# Patient Record
Sex: MALE | Race: WHITE | NOT HISPANIC OR LATINO | Employment: FULL TIME | URBAN - METROPOLITAN AREA
[De-identification: names, ages, dates, MRNs, and addresses within clinical notes are randomized per-mention and may not be internally consistent; named-entity substitution may affect disease eponyms.]

---

## 2017-11-22 ENCOUNTER — OFFICE VISIT (OUTPATIENT)
Dept: AUDIOLOGY | Facility: CLINIC | Age: 62
End: 2017-11-22

## 2017-11-22 DIAGNOSIS — H90.3 SENSORINEURAL HEARING LOSS, BILATERAL: Primary | ICD-10-CM

## 2017-11-22 PROCEDURE — 99207 ZZC NO CHARGE LOS: CPT | Performed by: AUDIOLOGIST

## 2017-11-22 PROCEDURE — V5299 HEARING SERVICE: HCPCS | Performed by: AUDIOLOGIST

## 2017-11-22 NOTE — MR AVS SNAPSHOT
"              After Visit Summary   2017    Wayne Gage    MRN: [id number]           Patient Information     Date Of Birth          1955        Visit Information        Provider Department      2017 8:00 AM [facility]        Today's Diagnoses     Sensorineural hearing loss, bilateral    -  1       Follow-ups after your visit        Who to contact     If you have questions or need follow up information about today's clinic visit or your schedule please contact [facility] directly at [phone].  Normal or non-critical lab and imaging results will be communicated to you by Linksifyhart, letter or phone within 4 business days after the clinic has received the results. If you do not hear from us within 7 days, please contact the clinic through Linksifyhart or phone. If you have a critical or abnormal lab result, we will notify you by phone as soon as possible.  Submit refill requests through Clutch or call your pharmacy and they will forward the refill request to us. Please allow 3 business days for your refill to be completed.          Additional Information About Your Visit        MyChart Information     Clutch lets you send messages to your doctor, view your test results, renew your prescriptions, schedule appointments and more. To sign up, go to [email or web address] . Click on \"Log in\" on the left side of the screen, which will take you to the Welcome page. Then click on \"Sign up Now\" on the right side of the page.     You will be asked to enter the access code listed below, as well as some personal information. Please follow the directions to create your username and password.     Your access code is: [email or web address]  Expires: 2018 10:11 AM     Your access code will  in 90 days. If you need help or a new code, please call your [facility] or [phone].        Care EveryWhere ID     This is your Care EveryWhere ID. This could be used " by other organizations to access your [facility] medical records  [id number]         Blood Pressure from Last 3 Encounters:   02/05/14 140/80   05/24/13 122/80   02/28/13 140/76    Weight from Last 3 Encounters:   02/05/14 172 lb (78 kg)   05/24/13 173 lb 3.2 oz (78.6 kg)   02/28/13 172 lb 12.8 oz (78.4 kg)              We Performed the Following     HEARING SERVICES, [facility]        Primary Care Provider Office Phone # Fax #    Fred Pimentel [phone]       [facility] [place] [address] [place] [address]        Equal Access to Services     JAZMIN ROBERTO : Hadii aad ku hadasho Soomaali, waaxda luqadaha, qaybta kaalmada adeegyada, waxay idiin hayilda ray. So [facility] [phone].    ATENCIÓN: Si habla español, tiene a pope disposición servicios gratuitos de asistencia lingüística. Llame al [phone].    We comply with applicable federal civil rights laws and [place] laws. We do not discriminate on the basis of race, color, national origin, age, disability, sex, sexual orientation, or gender identity.            Thank you!     Thank you for choosing [facility]  for your care. Our goal is always to provide you with excellent care. Hearing back from our patients is one way we can continue to improve our services. Please take a few minutes to complete the written survey that you may receive in the mail after your visit with us. Thank you!             Your Updated Medication List - Protect others around you: Learn how to safely use, store and throw away your medicines at www.disposemymeds.org.          This list is accurate as of: 11/22/17 10:11 AM.  Always use your most recent med list.                   Brand Name Dispense Instructions for use Diagnosis    ascorbic acid 250 MG Chew chewable tablet    vitamin C     Take 250 mg by mouth daily.        aspirin 81 MG tablet      Take 1 tablet by mouth daily.        carboxymethylcellulose 1 % ophthalmic  solution    CELLUVISC/REFRESH LIQUIGEL     Place 1 drop into both eyes as needed for dry eyes        cholecalciferol 400 UNIT Tabs tablet    vitamin D3     Take 400 Units by mouth daily.        diclofenac 75 MG EC tablet    VOLTAREN    60 tablet    Take 1 tablet (75 mg) by mouth 2 times daily Try just at dinner, but if needed increase to twice a day.    Right rotator cuff tear       Garlic 500 MG Tabs      Take  by mouth daily.        MENS 50+ MULTI VITAMIN/MIN Tabs      Take  by mouth 2 times daily.        sildenafil 50 MG tablet    VIAGRA    6 tablet    Take 1 tablet by mouth daily as needed.    ED (erectile dysfunction)       ZINC ACETATE      daily.

## 2017-11-22 NOTE — PROGRESS NOTES
"HEARING AID RECHECK    Patient Name:  Wayne Gage    Patient Age:   62 year old    :  1955    Background:   Patient is here to have his hearing aids checked for their \"annual check up\". Patient reports no problems with the hearing aids ane he reports hearing well with them. Patient has Phonak Trend[employer]enteo  hearing aids that were purchased in .     Procedures:   General cleaning of the hearing aids was performed. The small open domes and waxguards were changed. Per patient request extra small open domes were provided to the patient. Hearing aids were checked on the Verifit against  specifications and found to be performing well.     Plan:   Patient will return as needed for hearing aid concerns.     CHARGES:   .001 [facility] $30     Kofi De Los Santos CCC-A  Licensed Audiologist  2017      "

## 2018-02-21 ENCOUNTER — OFFICE VISIT (OUTPATIENT)
Dept: OTOLARYNGOLOGY | Facility: CLINIC | Age: 63
End: 2018-02-21
Payer: COMMERCIAL

## 2018-02-21 VITALS
TEMPERATURE: 97.5 F | HEART RATE: 86 BPM | HEIGHT: 68 IN | OXYGEN SATURATION: 99 % | SYSTOLIC BLOOD PRESSURE: 147 MMHG | WEIGHT: 173 LBS | DIASTOLIC BLOOD PRESSURE: 85 MMHG | BODY MASS INDEX: 26.22 KG/M2

## 2018-02-21 DIAGNOSIS — R09.82 PND (POST-NASAL DRIP): Primary | ICD-10-CM

## 2018-02-21 DIAGNOSIS — R05.9 COUGH: ICD-10-CM

## 2018-02-21 PROCEDURE — 99203 OFFICE O/P NEW LOW 30 MIN: CPT | Performed by: OTOLARYNGOLOGY

## 2018-02-21 RX ORDER — TAMSULOSIN HYDROCHLORIDE 0.4 MG/1
1 CAPSULE ORAL AT BEDTIME
Refills: 6 | COMMUNITY
Start: 2018-01-16

## 2018-02-21 NOTE — PATIENT INSTRUCTIONS
General Scheduling Information  To schedule your CT/MRI scan, please contact [place] [facility] at [phone]   [address] [place] [address]    To schedule your Surgery, please contact our Specialty Schedulers at [phone]    ENT Clinic Locations Clinic Hours Telephone Number     Loren[facility] Strong  [address] [place] [address]   Tuesday:       8:00am -- 4:00pm    Wednesday:  8:00am - 4:00pm   To schedule an appointment with   Dr. Carney,   please contact our   Specialty Scheduling Department at:     [phone]       Loren[facility]  [address] [place] [address]   Friday:          8:00am - 4:00pm         Urgent Care Locations Clinic Hours Telephone Numbers     Lorenzo Gao  [address] [place] [address]     Monday-Friday:     11:00pm - 9:00pm    Saturday-Sunday:  9:00am - 5:00pm   [phone]     Lorenzo Dempsey  [address] [place] [address]     Monday-Friday:      5:00pm - 9:00pm     Saturday-Sunday:  9:00am - 5:00pm   [phone]

## 2018-02-21 NOTE — LETTER
2/21/2018         RE: Wayne Gage  [address]  [place] [address]        Dear Colleague,    Thank you for referring your patient, Wayne Gage, to the [facility]. Please see a copy of my visit note below.    Chief Complaint - post-nasal drip    History of Present Illness - Wayne Gage is a 62 year old male who presents for evaluation of nasal drainage/post-nasal drainage. The patient describes symptoms of postnasal drainage and sore throat for the past 2.5 months. Then also had nasal congestion. Then it went into a coughing (purulent). Now has been coughing. Treatments within the last 2 months have included afrin (he was mouth breathing), z-pac, and nasal saline irrigations. The treatments seem to not help much. However, he is getting better. H/o sinus surgery by Dr. Amin. No history of smoking. No epistaxis.     Past Medical History -   Patient Active Problem List   Diagnosis     CARDIOVASCULAR SCREENING; LDL GOAL LESS THAN 160     ED (erectile dysfunction)     Advance care planning     BPH (benign prostatic hypertrophy)     Chronic maxillary sinusitis     Chronic ethmoidal sinusitis     Deviated nasal septum     Hx of iritis, od     Right rotator cuff tear     Blepharitis, unspecified laterality       Current Medications -   Current Outpatient Prescriptions:      tamsulosin (FLOMAX) 0.4 MG capsule, Take 1 capsule by mouth At Bedtime, Disp: , Rfl: 6     carboxymethylcellulose (CELLUVISC/REFRESH LIQUIGEL) 1 % ophthalmic solution, Place 1 drop into both eyes as needed for dry eyes, Disp: , Rfl:      sildenafil (VIAGRA) 50 MG tablet, Take 1 tablet by mouth daily as needed., Disp: 6 tablet, Rfl: 12     ascorbic acid (VITAMIN C) 250 MG CHEW, Take 250 mg by mouth daily., Disp: , Rfl:      aspirin 81 MG tablet, Take 1 tablet by mouth daily., Disp: , Rfl:      cholecalciferol (VITAMIN D) 400 UNIT TABS, Take 400 Units by mouth daily., Disp: , Rfl:      Multiple Vitamins-Minerals (MENS 50+  "MULTI VITAMIN/MIN) TABS, Take  by mouth 2 times daily., Disp: , Rfl:      diclofenac (VOLTAREN) 75 MG EC tablet, Take 1 tablet (75 mg) by mouth 2 times daily Try just at dinner, but if needed increase to twice a day. (Patient not taking: Reported on 2/21/2018), Disp: 60 tablet, Rfl: 3     ZINC ACETATE, daily., Disp: , Rfl:      Garlic 500 MG TABS, Take  by mouth daily., Disp: , Rfl:     Allergies - No Known Allergies    Social History -   Social History     Social History     Marital status:      Spouse name: N/A     Number of children: N/A     Years of education: N/A     Social History Main Topics     Smoking status: Never Smoker     Smokeless tobacco: Never Used     Alcohol use Yes      Comment: Occasional social drinks.     Drug use: No     Sexual activity: Yes     Partners: Female     Other Topics Concern     None     Social History Narrative       Family History -   Family History   Problem Relation Age of Onset     Breast Cancer Mother      CANCER Mother      pancreatic     DIABETES Maternal Grandmother      HEART DISEASE Brother      Alzheimer Disease Father      Circulatory Father      blood clot       Review of Systems - As per HPI and PMHx, otherwise 10+ comprehensive system review is negative.    Physical Exam  /85  Pulse 86  Temp 97.5  F (36.4  C) (Oral)  Ht 1.727 m (5' 8\")  Wt 78.5 kg (173 lb)  SpO2 99%  BMI 26.3 kg/m2  General - The patient is in no distress. Alert and oriented to person and place, answers questions and cooperates with examination appropriately.   Neurologic - CN II-XII are grossly intact. No focal neurologic deficits.   Voice and Breathing - The patient was breathing comfortably without the use of accessory muscles. There was no wheezing, stridor, or stertor.  The patients voice was clear and strong.  Eyes - Extraocular movements intact. Sclera were not icteric or injected, conjunctiva were pink and moist.  Mouth - Examination of the oral cavity showed pink, healthy " oral mucosa. No lesions or ulcerations noted.  The tongue was mobile and midline, and the dentition were in good condition.    Throat - The walls of the oropharynx were smooth, pink, moist, symmetric, and had no lesions or ulcerations.  The tonsillar pillars and soft palate were symmetric.  The uvula was midline on elevation. No postnasal drainage.  Nose - External contour is symmetric, no gross deflection or scars.  Nasal mucosa is pink and moist with clear mucus. The turbinates are normal in size. The septum was midline and non-obstructive.  No polyps, masses, or purulence noted on examination.  Neck -  Palpation of the occipital, submental, submandibular, internal jugular chain, and supraclavicular nodes did not demonstrate any abnormal lymph nodes or masses. No parotid masses. Palpation of the thyroid was soft and smooth, with no nodules or goiter appreciated.  The trachea was mobile and midline.    A/P - Wayne Gage is a 62 year old male with postnasal drainage and cough from post-viral, possibly sinus, illness. He is much better. I recommend treatment with flonase and saline irrigations and more time.     Robert Carney MD  [facility]tol[facility]  [facility]        Again, thank you for allowing me to participate in the care of your patient.        Sincerely,        Robert Carney MD

## 2018-02-21 NOTE — MR AVS SNAPSHOT
After Visit Summary   2/21/2018    Wayne Gage    MRN: [id number]           Patient Information     Date Of Birth          1955        Visit Information        Provider Department      2/21/2018 9:00 AM Robert Carney MD [facility] [place]        Today's Diagnoses     PND (post-nasal drip)    -  1    Cough          Care Instructions    General Scheduling Information  To schedule your CT/MRI scan, please contact [place] [facility] at [phone] [address] [place] [address]    To schedule your Surgery, please contact our Specialty Schedulers at [phone]    ENT Clinic Locations Clinic Hours Telephone Number     [facility]  [address]  [place] [address]   Tuesday:       8:00am -- 4:00pm    Wednesday:  8:00am - 4:00pm   To schedule an appointment with   Dr. Carney,   please contact our   Specialty Scheduling Department at:     [phone]       [facility]oren[facility]psey  [address]   [place] [address]   Friday:          8:00am - 4:00pm         Urgent Care Locations Clinic Hours Telephone Numbers     Lorenzo Gao  [address]  [place] [address]     Monday-Friday:     11:00pm - 9:00pm    Saturday-Sunday:  9:00am - 5:00pm   [phone]     Lorenzo Dempsey  [address]   [place] [address]     Monday-Friday:      5:00pm - 9:00pm     Saturday-Sunday:  9:00am - 5:00pm   [phone]                 Follow-ups after your visit        Your next 10 appointments already scheduled     Mar 26, 2018  8:00 AM CDT   New Visit with Harjit Deluna MD   [facility]    [address]  [place] [address] [phone]              Who to contact     If you have questions or need follow up information about today's clinic visit or your schedule please contact [facility] directly at [phone].  Normal or non-critical lab and imaging results will be communicated to you by  "MyChart, letter or phone within 4 business days after the clinic has received the results. If you do not hear from us within 7 days, please contact the clinic through Autobutlerhart or phone. If you have a critical or abnormal lab result, we will notify you by phone as soon as possible.  Submit refill requests through Itiva or call your pharmacy and they will forward the refill request to us. Please allow 3 business days for your refill to be completed.          Additional Information About Your Visit        Autobutlerhart Information     Itiva lets you send messages to your doctor, view your test results, renew your prescriptions, schedule appointments and more. To sign up, go to [email or web address] . Click on \"Log in\" on the left side of the screen, which will take you to the Welcome page. Then click on \"Sign up Now\" on the right side of the page.     You will be asked to enter the access code listed below, as well as some personal information. Please follow the directions to create your username and password.     Your access code is: [email or web address]  Expires: 2018  3:40 PM     Your access code will  in 90 days. If you need help or a new code, please call your [place] clinic or [phone].        Care EveryWhere ID     This is your Care EveryWhere ID. This could be used by other organizations to access your [place] medical records  [id number]        Your Vitals Were     Pulse Temperature Height Pulse Oximetry BMI (Body Mass Index)       86 97.5  F (36.4  C) (Oral) 1.727 m (5' 8\") 99% 26.3 kg/m2        Blood Pressure from Last 3 Encounters:   18 147/85   14 140/80   13 122/80    Weight from Last 3 Encounters:   18 78.5 kg (173 lb)   14 78 kg (172 lb)   13 78.6 kg (173 lb 3.2 oz)              Today, you had the following     No orders found for display       Primary Care Provider Office Phone # Fax #    Fred Pimentel [phone]       [facility] [place] [address]  [place] [address]        Equal Access to Services     RIRIKHARI FELISA : Hadii brandon koehler delmeraisha Soyenali, waaxda luqadaha, qaybta kaamberda ianphuongmary kate, waxay idiin haymaryellenbozena louisecuatefrancesco ray. So [facility] [phone].    ATENCIÓN: Si habla español, tiene a pope disposición servicios gratuitos de asistencia lingüística. Llame al [phone].    We comply with applicable federal civil rights laws and [place] laws. We do not discriminate on the basis of race, color, national origin, age, disability, sex, sexual orientation, or gender identity.            Thank you!     Thank you for choosing [facility]  for your care. Our goal is always to provide you with excellent care. Hearing back from our patients is one way we can continue to improve our services. Please take a few minutes to complete the written survey that you may receive in the mail after your visit with us. Thank you!             Your Updated Medication List - Protect others around you: Learn how to safely use, store and throw away your medicines at www.disposemymeds.org.          This list is accurate as of 2/21/18  3:40 PM.  Always use your most recent med list.                   Brand Name Dispense Instructions for use Diagnosis    ascorbic acid 250 MG Chew chewable tablet    vitamin C     Take 250 mg by mouth daily.        aspirin 81 MG tablet      Take 1 tablet by mouth daily.        carboxymethylcellulose 1 % ophthalmic solution    CELLUVISC/REFRESH LIQUIGEL     Place 1 drop into both eyes as needed for dry eyes        cholecalciferol 400 UNIT Tabs tablet    vitamin D3     Take 400 Units by mouth daily.        diclofenac 75 MG EC tablet    VOLTAREN    60 tablet    Take 1 tablet (75 mg) by mouth 2 times daily Try just at dinner, but if needed increase to twice a day.    Right rotator cuff tear       Garlic 500 MG Tabs      Take  by mouth daily.        MENS 50+ MULTI VITAMIN/MIN Tabs      Take  by mouth 2 times daily.         sildenafil 50 MG tablet    VIAGRA    6 tablet    Take 1 tablet by mouth daily as needed.    ED (erectile dysfunction)       tamsulosin 0.4 MG capsule    FLOMAX     Take 1 capsule by mouth At Bedtime        ZINC ACETATE      daily.

## 2018-02-21 NOTE — PROGRESS NOTES
Chief Complaint - post-nasal drip    History of Present Illness - Wayne Gage is a 62 year old male who presents for evaluation of nasal drainage/post-nasal drainage. The patient describes symptoms of postnasal drainage and sore throat for the past 2.5 months. Then also had nasal congestion. Then it went into a coughing (purulent). Now has been coughing. Treatments within the last 2 months have included afrin (he was mouth breathing), z-pac, and nasal saline irrigations. The treatments seem to not help much. However, he is getting better. H/o sinus surgery by Dr. Amin. No history of smoking. No epistaxis.     Past Medical History -   Patient Active Problem List   Diagnosis     CARDIOVASCULAR SCREENING; LDL GOAL LESS THAN 160     ED (erectile dysfunction)     Advance care planning     BPH (benign prostatic hypertrophy)     Chronic maxillary sinusitis     Chronic ethmoidal sinusitis     Deviated nasal septum     Hx of iritis, od     Right rotator cuff tear     Blepharitis, unspecified laterality       Current Medications -   Current Outpatient Prescriptions:      tamsulosin (FLOMAX) 0.4 MG capsule, Take 1 capsule by mouth At Bedtime, Disp: , Rfl: 6     carboxymethylcellulose (CELLUVISC/REFRESH LIQUIGEL) 1 % ophthalmic solution, Place 1 drop into both eyes as needed for dry eyes, Disp: , Rfl:      sildenafil (VIAGRA) 50 MG tablet, Take 1 tablet by mouth daily as needed., Disp: 6 tablet, Rfl: 12     ascorbic acid (VITAMIN C) 250 MG CHEW, Take 250 mg by mouth daily., Disp: , Rfl:      aspirin 81 MG tablet, Take 1 tablet by mouth daily., Disp: , Rfl:      cholecalciferol (VITAMIN D) 400 UNIT TABS, Take 400 Units by mouth daily., Disp: , Rfl:      Multiple Vitamins-Minerals (MENS 50+ MULTI VITAMIN/MIN) TABS, Take  by mouth 2 times daily., Disp: , Rfl:      diclofenac (VOLTAREN) 75 MG EC tablet, Take 1 tablet (75 mg) by mouth 2 times daily Try just at dinner, but if needed increase to twice a day. (Patient not taking:  "Reported on 2/21/2018), Disp: 60 tablet, Rfl: 3     ZINC ACETATE, daily., Disp: , Rfl:      Garlic 500 MG TABS, Take  by mouth daily., Disp: , Rfl:     Allergies - No Known Allergies    Social History -   Social History     Social History     Marital status:      Spouse name: N/A     Number of children: N/A     Years of education: N/A     Social History Main Topics     Smoking status: Never Smoker     Smokeless tobacco: Never Used     Alcohol use Yes      Comment: Occasional social drinks.     Drug use: No     Sexual activity: Yes     Partners: Female     Other Topics Concern     None     Social History Narrative       Family History -   Family History   Problem Relation Age of Onset     Breast Cancer Mother      CANCER Mother      pancreatic     DIABETES Maternal Grandmother      HEART DISEASE Brother      Alzheimer Disease Father      Circulatory Father      blood clot       Review of Systems - As per HPI and PMHx, otherwise 10+ comprehensive system review is negative.    Physical Exam  /85  Pulse 86  Temp 97.5  F (36.4  C) (Oral)  Ht 1.727 m (5' 8\")  Wt 78.5 kg (173 lb)  SpO2 99%  BMI 26.3 kg/m2  General - The patient is in no distress. Alert and oriented to person and place, answers questions and cooperates with examination appropriately.   Neurologic - CN II-XII are grossly intact. No focal neurologic deficits.   Voice and Breathing - The patient was breathing comfortably without the use of accessory muscles. There was no wheezing, stridor, or stertor.  The patients voice was clear and strong.  Eyes - Extraocular movements intact. Sclera were not icteric or injected, conjunctiva were pink and moist.  Mouth - Examination of the oral cavity showed pink, healthy oral mucosa. No lesions or ulcerations noted.  The tongue was mobile and midline, and the dentition were in good condition.    Throat - The walls of the oropharynx were smooth, pink, moist, symmetric, and had no lesions or ulcerations.  " The tonsillar pillars and soft palate were symmetric.  The uvula was midline on elevation. No postnasal drainage.  Nose - External contour is symmetric, no gross deflection or scars.  Nasal mucosa is pink and moist with clear mucus. The turbinates are normal in size. The septum was midline and non-obstructive.  No polyps, masses, or purulence noted on examination.  Neck -  Palpation of the occipital, submental, submandibular, internal jugular chain, and supraclavicular nodes did not demonstrate any abnormal lymph nodes or masses. No parotid masses. Palpation of the thyroid was soft and smooth, with no nodules or goiter appreciated.  The trachea was mobile and midline.    A/P - Wayne Gage is a 62 year old male with postnasal drainage and cough from post-viral, possibly sinus, illness. He is much better. I recommend treatment with flonase and saline irrigations and more time.     Robert Carney MD  Otolaryngology  [facility]

## 2018-02-27 ENCOUNTER — TELEPHONE (OUTPATIENT)
Dept: OPHTHALMOLOGY | Facility: CLINIC | Age: 63
End: 2018-02-27

## 2018-02-27 NOTE — TELEPHONE ENCOUNTER
Wayne is traveling over the next month and would like to have his glasses prescription with him in case something happens.  He would like to have the expiration date extended for a month.  Please email the new RX to him at [email or web address] when and if the date has been changed.  Please call him on his cell if there are any problems.  He will be leaving in a couple of days.     Luna [place]

## 2018-02-28 NOTE — TELEPHONE ENCOUNTER
I assisted Mario in taking care of this yesterday.  It was emailed to the patient with an adjusted expiration date.

## 2018-04-27 ENCOUNTER — OFFICE VISIT (OUTPATIENT)
Dept: OPHTHALMOLOGY | Facility: CLINIC | Age: 63
End: 2018-04-27
Payer: COMMERCIAL

## 2018-04-27 DIAGNOSIS — H01.02B SQUAMOUS BLEPHARITIS OF UPPER AND LOWER EYELIDS OF BOTH EYES: ICD-10-CM

## 2018-04-27 DIAGNOSIS — H01.02A SQUAMOUS BLEPHARITIS OF UPPER AND LOWER EYELIDS OF BOTH EYES: ICD-10-CM

## 2018-04-27 DIAGNOSIS — H20.9 IRITIS: Primary | ICD-10-CM

## 2018-04-27 DIAGNOSIS — H52.4 PRESBYOPIA: ICD-10-CM

## 2018-04-27 PROCEDURE — 92014 COMPRE OPH EXAM EST PT 1/>: CPT | Performed by: OPHTHALMOLOGY

## 2018-04-27 PROCEDURE — 92015 DETERMINE REFRACTIVE STATE: CPT | Performed by: OPHTHALMOLOGY

## 2018-04-27 ASSESSMENT — SLIT LAMP EXAM - LIDS
COMMENTS: 2+ BLEPHARITIS, 2+ MEIBOMIAN GLAND DYSFUNCTION
COMMENTS: 2+ BLEPHARITIS, 2+ MEIBOMIAN GLAND DYSFUNCTION

## 2018-04-27 ASSESSMENT — REFRACTION_WEARINGRX
OS_CYLINDER: +0.50
OS_SPHERE: +1.25
OS_ADD: +2.53
OD_AXIS: 075
OD_SPHERE: +1.25
OD_ADD: +2.50
OS_ADD: +2.50
SPECS_TYPE: PAL
OD_SPHERE: +1.00
OS_SPHERE: +0.75
OD_CYLINDER: +0.50
OD_CYLINDER: SPHERE
OS_AXIS: 006
OS_CYLINDER: SPHERE
OD_ADD: +2.53

## 2018-04-27 ASSESSMENT — VISUAL ACUITY
OD_SC: 20/20
CORRECTION_TYPE: GLASSES
METHOD: SNELLEN - LINEAR
OD_SC+: -1
OS_SC: 20/20

## 2018-04-27 ASSESSMENT — REFRACTION_MANIFEST
OD_CYLINDER: +0.50
OS_CYLINDER: +0.25
OD_AXIS: 028
OS_ADD: +2.50
OD_SPHERE: +1.25
OD_ADD: +2.50
OS_SPHERE: +0.75
OS_AXIS: 175

## 2018-04-27 ASSESSMENT — TONOMETRY
OD_IOP_MMHG: 15
OS_IOP_MMHG: 16
IOP_METHOD: APPLANATION

## 2018-04-27 ASSESSMENT — EXTERNAL EXAM - RIGHT EYE: OD_EXAM: NORMAL

## 2018-04-27 ASSESSMENT — CONF VISUAL FIELD
METHOD: COUNTING FINGERS
OS_NORMAL: 1
OD_NORMAL: 1

## 2018-04-27 ASSESSMENT — CUP TO DISC RATIO
OD_RATIO: 0.6
OS_RATIO: 0.6

## 2018-04-27 ASSESSMENT — EXTERNAL EXAM - LEFT EYE: OS_EXAM: NORMAL

## 2018-04-27 NOTE — PATIENT INSTRUCTIONS
Glasses Rx given - optional  Possible posterior vitreous detachment (sudden onset large floater and/or flashing lights) discussed.  Call in December 2019 for an appointment in April 2020 for Complete Exam    Dr. Deluna [phone]

## 2018-04-27 NOTE — MR AVS SNAPSHOT
"              After Visit Summary   4/27/2018    Wayne Gage    MRN: [id number]           Patient Information     Date Of Birth          1955        Visit Information        Provider Department      4/27/2018 2:30 PM Harjit Deluna MD [facility]        Today's Diagnoses     Presbyopia    -  1      Care Instructions    Glasses Rx given - optional  Possible posterior vitreous detachment (sudden onset large floater and/or flashing lights) discussed.  Call in December 2019 for an appointment in April 2020 for Complete Exam    Dr. Deluna [phone]          Follow-ups after your visit        Who to contact     If you have questions or need follow up information about today's clinic visit or your schedule please contact [facility] directly at [phone].  Normal or non-critical lab and imaging results will be communicated to you by MyChart, letter or phone within 4 business days after the clinic has received the results. If you do not hear from us within 7 days, please contact the clinic through MyChart or phone. If you have a critical or abnormal lab result, we will notify you by phone as soon as possible.  Submit refill requests through [employer] or call your pharmacy and they will forward the refill request to us. Please allow 3 business days for your refill to be completed.          Additional Information About Your Visit        MyChart Information     [employer] lets you send messages to your doctor, view your test results, renew your prescriptions, schedule appointments and more. To sign up, go to [email or web address] . Click on \"Log in\" on the left side of the screen, which will take you to the Welcome page. Then click on \"Sign up Now\" on the right side of the page.     You will be asked to enter the access code listed below, as well as some personal information. Please follow the directions to create your username and password.     Your access code is: " [email or web address]  Expires: 2018  4:40 PM     Your access code will  in 90 days. If you need help or a new code, please call your [place] clinic or [phone].        Care EveryWhere ID     This is your Care EveryWhere ID. This could be used by other organizations to access your [place] medical records  [id number]         Blood Pressure from Last 3 Encounters:   18 147/85   14 140/80   13 122/80    Weight from Last 3 Encounters:   18 78.5 kg (173 lb)   14 78 kg (172 lb)   13 78.6 kg (173 lb 3.2 oz)              Today, you had the following     No orders found for display       Primary Care Provider Office Phone # Fax #    Fred Pimentel [phone]       [facility] [place] [address] [place] [address]        Equal Access to Services     [facility] : Hadii aad ku hadasho Soomaali, waaxda luqadaha, qaybta kaalmada adeegyada, waxay idiin hayaan adeeg kharash la'aan . So [facility] [phone].    ATENCIÓN: Si habla español, tiene a pope disposición servicios gratuitos de asistencia lingüística. Llame al [phone].    We comply with applicable federal civil rights laws and [place] laws. We do not discriminate on the basis of race, color, national origin, age, disability, sex, sexual orientation, or gender identity.            Thank you!     Thank you for choosing [facility]  for your care. Our goal is always to provide you with excellent care. Hearing back from our patients is one way we can continue to improve our services. Please take a few minutes to complete the written survey that you may receive in the mail after your visit with us. Thank you!             Your Updated Medication List - Protect others around you: Learn how to safely use, store and throw away your medicines at www.disposemymeds.org.          This list is accurate as of 18  4:09 PM.  Always use your most recent med list.                   Brand  Name Dispense Instructions for use Diagnosis    ascorbic acid 250 MG Chew chewable tablet    vitamin C     Take 250 mg by mouth daily.        aspirin 81 MG tablet      Take 1 tablet by mouth daily.        carboxymethylcellulose 1 % ophthalmic solution    CELLUVISC/REFRESH LIQUIGEL     Place 1 drop into both eyes as needed for dry eyes        cholecalciferol 400 UNIT Tabs tablet    vitamin D3     Take 400 Units by mouth daily.        diclofenac 75 MG EC tablet    VOLTAREN    60 tablet    Take 1 tablet (75 mg) by mouth 2 times daily Try just at dinner, but if needed increase to twice a day.    Right rotator cuff tear       Garlic 500 MG Tabs      Take  by mouth daily.        MENS 50+ MULTI VITAMIN/MIN Tabs      Take  by mouth 2 times daily.        REFRESH OP      Apply 1 drop to eye as needed Both eyes.        sildenafil 50 MG tablet    VIAGRA    6 tablet    Take 1 tablet by mouth daily as needed.    ED (erectile dysfunction)       tamsulosin 0.4 MG capsule    FLOMAX     Take 1 capsule by mouth At Bedtime        ZINC ACETATE      daily.

## 2018-04-27 NOTE — PROGRESS NOTES
Current Eye Medications:  Refresh as needed both eyes.     Subjective:  Complete eye exam. Vision is good both eyes distance and near. No eye pain or discomfort in either eye. Having couple of new floaters come and go in last 2 weeks, unsure what eye. Has had different floaters in past. Zero light flashes or curtain effect both eyes.     Objective:  See Ophthalmology Exam.       Assessment:  Stable eye exam.      ICD-10-CM    1. Hx of iritis, od H20.9 EYE EXAM (SIMPLE-NONBILLABLE)   2. Squamous blepharitis of upper and lower eyelids of both eyes H01.021     H01.022     H01.024     H01.025    3. Presbyopia H52.4 REFRACTIVE STATUS        Plan:  Glasses Rx given - optional  Possible posterior vitreous detachment (sudden onset large floater and/or flashing lights) discussed.  Call in December 2019 for an appointment in April 2020 for Complete Exam    Dr. Deluna [phone]

## 2018-04-27 NOTE — LETTER
4/27/2018         RE: Wayne Gage  [address]  [place] [address]        Dear Colleague,    Thank you for referring your patient, Wayne Gage, to the [facility]. Please see a copy of my visit note below.     Current Eye Medications:  Refresh as needed both eyes.     Subjective:  Complete eye exam. Vision is good both eyes distance and near. No eye pain or discomfort in either eye. Having couple of new floaters come and go in last 2 weeks, unsure what eye. Has had different floaters in past. Zero light flashes or curtain effect both eyes.     Objective:  See Ophthalmology Exam.       Assessment:  Stable eye exam.      ICD-10-CM    1. Hx of iritis, od H20.9 EYE EXAM (SIMPLE-NONBILLABLE)   2. Squamous blepharitis of upper and lower eyelids of both eyes H01.021     H01.022     H01.024     H01.025    3. Presbyopia H52.4 REFRACTIVE STATUS        Plan:  Glasses Rx given - optional  Possible posterior vitreous detachment (sudden onset large floater and/or flashing lights) discussed.  Call in December 2019 for an appointment in April 2020 for Complete Exam    Dr. Deluna [phone]           Again, thank you for allowing me to participate in the care of your patient.        Sincerely,        Harjit Deluna MD

## 2019-02-22 ENCOUNTER — OFFICE VISIT (OUTPATIENT)
Dept: AUDIOLOGY | Facility: CLINIC | Age: 64
End: 2019-02-22
Payer: COMMERCIAL

## 2019-02-22 DIAGNOSIS — H90.3 SENSORINEURAL HEARING LOSS, BILATERAL: Primary | ICD-10-CM

## 2019-02-22 PROCEDURE — 92557 COMPREHENSIVE HEARING TEST: CPT | Performed by: AUDIOLOGIST

## 2019-02-22 PROCEDURE — V5299 HEARING SERVICE: HCPCS | Performed by: AUDIOLOGIST

## 2019-02-22 PROCEDURE — 99207 ZZC NO CHARGE LOS: CPT | Performed by: AUDIOLOGIST

## 2019-02-22 PROCEDURE — 92550 TYMPANOMETRY & REFLEX THRESH: CPT | Performed by: AUDIOLOGIST

## 2019-02-22 NOTE — PROGRESS NOTES
AUDIOLOGY REPORT    SUBJECTIVE:  Wayne Gage is a 63 year old male who was seen in the Audiology Clinic [facility] on 2/22/19 for audiologic evaluation, referred by No ref. provider found. The patient has been seen previously in this clinic on 11/28/2016 for assessment and results indicated bilateral hearing loss that was previously shown to be sensorineural. The patient reports a gradual change in hearing and bilateral tinnitus that is not bothersome. The patient denies  bilateral otalgia, bilateral aural fullness, history of noise exposure, and dizziness. The patient notes difficulty with communication in a variety of listening situations. They were accompanied today by their self.    OBJECTIVE:    Otoscopic exam indicates ears are clear of cerumen bilaterally     Pure Tone Thresholds assessed using standard techniques  audiometry with good  reliability from 250-8000 Hz bilaterally using insert earphones     RIGHT:  normal hearing sensitivity through 1500 Hz then mild to severe sensorineural hearing loss    LEFT:    normal hearing sensitivity through 1500 Hz then mild to severe sensorineural hearing loss    Tympanogram:    RIGHT: normal eardrum mobility    LEFT:   normal eardrum mobility    Speech Reception Threshold:    RIGHT: 20 dB HL    LEFT:   20 dB HL    Word Recognition Score:     RIGHT: 88% at 65 dB HL using NU-6 recorded word list.    LEFT:   96% at 65 dB HL using NU-6 recorded word list.      ASSESSMENT:   Bilateral sensorineural hearing loss    Compared to patient's previous audiogram dated 11/28/2016, hearing has slightly worsened in the high frequencies. Today s results were discussed with the patient in detail.     Following the hearing test the patient's hearing aids were cleaned and checked. A biologic listening check found the hearing aids sounding crisp and clear. The domes were changed to medium closed domes and the acoustic parameters were changed in the software. The patient  reported improved sound.    PLAN:  Patient was counseled regarding hearing loss and impact on communication.  It is recommended that the patient call in two weeks to let us know if he is satisfied with the changes made to the hearing aids. If he does we will send him more medium closed domes.  Please call this clinic with questions regarding these results or recommendations.      Mirlande De Los Santos CCC-A  Licensed Audiologist #[id number]    Kofi De Los Santos CCC-A  Licensed Audiologist #[id number]  2/22/2019

## 2019-03-04 ENCOUNTER — TELEPHONE (OUTPATIENT)
Dept: AUDIOLOGY | Facility: CLINIC | Age: 64
End: 2019-03-04

## 2019-03-04 NOTE — TELEPHONE ENCOUNTER
Patient called to report that he is liking the change to the closed domes and he would like more sent to his home address. Medium closed domes for Phonak were mailed to the address on file.    -Kofi De Los Santos CCC-A

## 2019-03-15 ENCOUNTER — OFFICE VISIT (OUTPATIENT)
Dept: OPHTHALMOLOGY | Facility: CLINIC | Age: 64
End: 2019-03-15
Payer: COMMERCIAL

## 2019-03-15 DIAGNOSIS — H43.811 POSTERIOR VITREOUS DETACHMENT OF RIGHT EYE: Primary | ICD-10-CM

## 2019-03-15 PROCEDURE — 92012 INTRM OPH EXAM EST PATIENT: CPT | Performed by: OPHTHALMOLOGY

## 2019-03-15 ASSESSMENT — VISUAL ACUITY
OS_CC: 20/20
CORRECTION_TYPE: GLASSES
METHOD: SNELLEN - LINEAR
OD_CC: 20/25

## 2019-03-15 ASSESSMENT — TONOMETRY
IOP_METHOD: APPLANATION
OD_IOP_MMHG: 16
OS_IOP_MMHG: 18

## 2019-03-15 ASSESSMENT — CUP TO DISC RATIO
OD_RATIO: 0.6
OS_RATIO: 0.6

## 2019-03-15 ASSESSMENT — EXTERNAL EXAM - RIGHT EYE: OD_EXAM: NORMAL

## 2019-03-15 ASSESSMENT — EXTERNAL EXAM - LEFT EYE: OS_EXAM: NORMAL

## 2019-03-15 NOTE — LETTER
"    3/15/2019         RE: Wayne Gage  [address] [address]        Dear Colleague,    Thank you for referring your patient, Wayne Gage, to the [facility]. Please see a copy of my visit note below.     Current Eye Medications:  systane prn     Subjective:  Flashes and floater right eye    As above plus  noticed two days ago , continues to see arc like flashes in outer portion of vision and a floater that will float in front of her central vision. Pt states that had this \"almost like something fell in this eye sensation \" just before noticed flashing.      Objective:  See Ophthalmology Exam.       Assessment:  Acute posterior vitreous detachment right eye.      Plan:  You have a posterior vitreous detachment in the right eye.  Signs and symptoms of retinal detachment (shower of black floaters, frequent flashing even during day, curtain over part of visual field) discussed.  Return visit 5 - 6 weeks for a complete exam.  Harjit Deluna M.D.  [phone]             Again, thank you for allowing me to participate in the care of your patient.        Sincerely,        Harjit Deluna MD    "

## 2019-03-15 NOTE — PROGRESS NOTES
" Current Eye Medications:  systane prn     Subjective:  Flashes and floater right eye    As above plus  noticed two days ago , continues to see arc like flashes in outer portion of vision and a floater that will float in front of her central vision. Pt states that had this \"almost like something fell in this eye sensation \" just before noticed flashing.      Objective:  See Ophthalmology Exam.       Assessment:  Acute posterior vitreous detachment right eye.      Plan:  You have a posterior vitreous detachment in the right eye.  Signs and symptoms of retinal detachment (shower of black floaters, frequent flashing even during day, curtain over part of visual field) discussed.  Return visit 5 - 6 weeks for a complete exam.  Harjit Deluna M.D.  [phone]           "

## 2019-03-15 NOTE — PATIENT INSTRUCTIONS
You have a posterior vitreous detachment in the right eye.  Signs and symptoms of retinal detachment (shower of black floaters, frequent flashing even during day, curtain over part of visual field) discussed.  Return visit 5 - 6 weeks for a complete exam.  Harjit Deluna M.D.  [phone]

## 2019-03-16 PROBLEM — H43.811 POSTERIOR VITREOUS DETACHMENT OF RIGHT EYE: Status: ACTIVE | Noted: 2019-03-16

## 2019-04-29 ENCOUNTER — OFFICE VISIT (OUTPATIENT)
Dept: OPHTHALMOLOGY | Facility: CLINIC | Age: 64
End: 2019-04-29
Payer: COMMERCIAL

## 2019-04-29 DIAGNOSIS — Z01.01 ENCOUNTER FOR EXAMINATION OF EYES AND VISION WITH ABNORMAL FINDINGS: Primary | ICD-10-CM

## 2019-04-29 DIAGNOSIS — H43.811 POSTERIOR VITREOUS DETACHMENT OF RIGHT EYE: ICD-10-CM

## 2019-04-29 DIAGNOSIS — H52.4 PRESBYOPIA: ICD-10-CM

## 2019-04-29 DIAGNOSIS — H01.02A SQUAMOUS BLEPHARITIS OF UPPER AND LOWER EYELIDS OF BOTH EYES: ICD-10-CM

## 2019-04-29 DIAGNOSIS — H20.9 IRITIS: ICD-10-CM

## 2019-04-29 DIAGNOSIS — H01.02B SQUAMOUS BLEPHARITIS OF UPPER AND LOWER EYELIDS OF BOTH EYES: ICD-10-CM

## 2019-04-29 PROCEDURE — 92015 DETERMINE REFRACTIVE STATE: CPT | Performed by: OPHTHALMOLOGY

## 2019-04-29 PROCEDURE — 92014 COMPRE OPH EXAM EST PT 1/>: CPT | Performed by: OPHTHALMOLOGY

## 2019-04-29 ASSESSMENT — REFRACTION_WEARINGRX
OS_SPHERE: +0.75
OS_CYLINDER: SPHERE
SPECS_TYPE: PAL
OD_SPHERE: +1.25
OS_ADD: +2.50
OD_CYLINDER: SPHERE
OD_ADD: +2.50

## 2019-04-29 ASSESSMENT — CUP TO DISC RATIO
OS_RATIO: 0.6
OD_RATIO: 0.6

## 2019-04-29 ASSESSMENT — CONF VISUAL FIELD
OD_NORMAL: 1
OS_NORMAL: 1

## 2019-04-29 ASSESSMENT — EXTERNAL EXAM - RIGHT EYE: OD_EXAM: NORMAL

## 2019-04-29 ASSESSMENT — REFRACTION_MANIFEST
OD_ADD: +2.50
OS_CYLINDER: +0.50
OS_ADD: +2.50
OD_CYLINDER: +0.50
OD_SPHERE: +1.50
OS_AXIS: 026
OS_SPHERE: +1.00
OD_AXIS: 002

## 2019-04-29 ASSESSMENT — VISUAL ACUITY
OD_CC+: -2
OD_CC: 1-
OD_CC: 20/20
METHOD: SNELLEN - LINEAR
CORRECTION_TYPE: GLASSES
OS_CC: 20/20
OS_CC: 1-

## 2019-04-29 ASSESSMENT — TONOMETRY
OS_IOP_MMHG: 13
IOP_METHOD: APPLANATION
OD_IOP_MMHG: 13

## 2019-04-29 ASSESSMENT — EXTERNAL EXAM - LEFT EYE: OS_EXAM: NORMAL

## 2019-04-29 NOTE — LETTER
4/29/2019         RE: Wayne Gage  [address]  [place] [address]        Dear Colleague,    Thank you for referring your patient, Wayne Gage, to the [facility]. Please see a copy of my visit note below.     Current Eye Medications:  Refresh liquigel both eyes once every 4-5 days.      Subjective:  Comprehensive Eye Exam.  The flashing lights have diminished, but are still somewhat present.  He has a floater in his right eye that gets in the way of his vision occasionally, but hasn't been much of a problem. No changes or concerns with his left eye.       Objective:  See Ophthalmology Exam.       Assessment:  Stable recent posterior vitreous detachment right eye; otherwise stable eye exam.      ICD-10-CM    1. Encounter for examination of eyes and vision with abnormal findings Z01.01 REFRACTIVE STATUS   2. Presbyopia H52.4 REFRACTIVE STATUS   3. Posterior vitreous detachment of right eye H43.811 EYE EXAM (SIMPLE-NONBILLABLE)   4. Hx of iritis, od H20.9    5. Squamous blepharitis of upper and lower eyelids of both eyes H01.02A     H01.02B         Plan:  Possible posterior vitreous detachment (sudden onset large floater and/or flashing lights) left eye discussed.   Glasses Rx given - optional.   Use artificial tears up to 4 times daily both eyes. (Refresh Tears, Systane Ultra/Balance, or Theratears)   Call in December 2019 for an appointment in April 2020 for Complete Exam.    Dr. Deluna [phone]             Again, thank you for allowing me to participate in the care of your patient.        Sincerely,        Harjit Deluna MD

## 2019-04-29 NOTE — PROGRESS NOTES
Current Eye Medications:  Refresh liquigel both eyes once every 4-5 days.      Subjective:  Comprehensive Eye Exam.  The flashing lights have diminished, but are still somewhat present.  He has a floater in his right eye that gets in the way of his vision occasionally, but hasn't been much of a problem. No changes or concerns with his left eye.       Objective:  See Ophthalmology Exam.       Assessment:  Stable recent posterior vitreous detachment right eye; otherwise stable eye exam.      ICD-10-CM    1. Encounter for examination of eyes and vision with abnormal findings Z01.01 REFRACTIVE STATUS   2. Presbyopia H52.4 REFRACTIVE STATUS   3. Posterior vitreous detachment of right eye H43.811 EYE EXAM (SIMPLE-NONBILLABLE)   4. Hx of iritis, od H20.9    5. Squamous blepharitis of upper and lower eyelids of both eyes H01.02A     H01.02B         Plan:  Possible posterior vitreous detachment (sudden onset large floater and/or flashing lights) left eye discussed.   Glasses Rx given - optional.   Use artificial tears up to 4 times daily both eyes. (Refresh Tears, Systane Ultra/Balance, or Theratears)   Call in December 2019 for an appointment in April 2020 for Complete Exam.    Dr. Deluna [phone]

## 2019-04-29 NOTE — PATIENT INSTRUCTIONS
Possible posterior vitreous detachment (sudden onset large floater and/or flashing lights) left eye discussed.   Glasses Rx given - optional.   Use artificial tears up to 4 times daily both eyes. (Refresh Tears, Systane Ultra/Balance, or Theratears)   Call in December 2019 for an appointment in April 2020 for Complete Exam.    Dr. Deluna [phone]

## 2019-09-30 ENCOUNTER — ALLIED HEALTH/NURSE VISIT (OUTPATIENT)
Dept: AUDIOLOGY | Facility: CLINIC | Age: 64
End: 2019-09-30

## 2019-09-30 DIAGNOSIS — H90.3 SENSORINEURAL HEARING LOSS, BILATERAL: Primary | ICD-10-CM

## 2019-09-30 PROCEDURE — V5014 HEARING AID REPAIR/MODIFYING: HCPCS | Performed by: AUDIOLOGIST

## 2019-09-30 PROCEDURE — 99207 ZZC NO CHARGE LOS: CPT | Performed by: AUDIOLOGIST

## 2019-09-30 NOTE — PROGRESS NOTES
HEARING AID DROP-OFF    Patient Name:  Wayne Gage    Patient Age:   64 year old    :  1955    Background:   Patient dropped off their hearing aid with the report of tube came out.      SIDE: Left    : Phonak    TYPE: JANZZeo G05-100H JEYE    S/N: [id number]    WARRANTY:     Procedures:   The  wire was broken. After speaking to the patient and offering options for repair the patient elected to have the  replaced with a new stock size 2 xS .     Plan:   Patient was contacted in regards to status of hearing aid/s dropped off today. I informed Francisco Javier that the hearing aid was ready for  at the 2nd floor .     CHARGES:   .005  replacement     Kofi De Los Santos CCC-A  Licensed Audiologist  2019

## 2019-11-07 ENCOUNTER — OFFICE VISIT (OUTPATIENT)
Dept: AUDIOLOGY | Facility: CLINIC | Age: 64
End: 2019-11-07
Payer: COMMERCIAL

## 2019-11-07 DIAGNOSIS — H90.3 SENSORINEURAL HEARING LOSS, BILATERAL: Primary | ICD-10-CM

## 2019-11-07 PROCEDURE — V5299 HEARING SERVICE: HCPCS | Performed by: AUDIOLOGIST

## 2019-11-07 PROCEDURE — 99207 ZZC NO CHARGE LOS: CPT | Performed by: AUDIOLOGIST

## 2019-11-07 NOTE — PROGRESS NOTES
AUDIOLOGY REPORT: HEARING AID RECHECK    SUBJECTIVE: Wayne Gage is a 64 year old male, :  1955, was seen in the Audiology Clinic at [facility] on 19 for a return check of their hearing aids. Patient was unaccompanied to today's visit.     Background:   Patient is here today with the complaint of the left ear hearing aid is working out of his ear following a recent repair.     Procedures:   The hearing aid  was sitting out of the ear, near the entrance to the canal, not into the canal where is should sit. The problem was the retention arm was in the wrong place and orientation. New retention arms were placed on both receivers. Once the retention arm was replaced the left ear hearing aid was once again sitting in the correct orientation. Patient notes immediate improvement in fit, feel, and sound clarity.     Plan:   Patient will return as needed for hearing aid concerns. I advised that if he was having issues like this again he should ask to speak with me and we could arrange a visit during an open ENT time as this could get him seen sooner than waiting for a month to see me during an open appointment slot. Jack really appreciated this ability to be worked in sooner.     NO CHARGE VISIT    Kofi De Los Santos [facility]  Licensed Audiologist #[id number]  2019

## 2020-01-23 ENCOUNTER — ALLIED HEALTH/NURSE VISIT (OUTPATIENT)
Dept: AUDIOLOGY | Facility: CLINIC | Age: 65
End: 2020-01-23
Payer: COMMERCIAL

## 2020-01-23 DIAGNOSIS — H90.3 SENSORINEURAL HEARING LOSS, BILATERAL: Primary | ICD-10-CM

## 2020-01-23 PROCEDURE — 99207 ZZC NO CHARGE LOS: CPT | Performed by: AUDIOLOGIST

## 2020-01-23 PROCEDURE — V5014 HEARING AID REPAIR/MODIFYING: HCPCS | Performed by: AUDIOLOGIST

## 2020-01-23 NOTE — PROGRESS NOTES
Hearing Aid Drop Off      Patient dropped of right Phonak Nightingaleeo S13-325C CHALINO hearing aid with a broken  (2xS).    The  will be ordered for an out of warranty replacement.    Spoke to Francisco Javier on the phone. He is aware of the $100 charge and will receive a phone call when the replacement has arrived.    Magali Velazquez.  Audiologist, [place] #54538

## 2020-01-27 ENCOUNTER — TELEPHONE (OUTPATIENT)
Dept: AUDIOLOGY | Facility: CLINIC | Age: 65
End: 2020-01-27
Payer: COMMERCIAL

## 2020-01-27 DIAGNOSIS — H90.3 SENSORINEURAL HEARING LOSS, BILATERAL: Primary | ICD-10-CM

## 2020-01-27 PROCEDURE — V5014 HEARING AID REPAIR/MODIFYING: HCPCS | Performed by: AUDIOLOGIST

## 2020-01-27 NOTE — TELEPHONE ENCOUNTER
I informed Francisco Javier that the part has come in and his Phonak Audeo V43-027K hearing aid for his right ear has been repaired and is ready for  at the 2nd floor .     CHARGES:   .668 Out of warranty  replacement    Kofi De Los Santos [facility]-A  Licensed Audiologist #[id number]  1/27/2020

## 2021-05-05 ENCOUNTER — OFFICE VISIT (OUTPATIENT)
Dept: OPHTHALMOLOGY | Facility: CLINIC | Age: 66
End: 2021-05-05
Payer: COMMERCIAL

## 2021-05-05 DIAGNOSIS — H20.9 IRITIS: ICD-10-CM

## 2021-05-05 DIAGNOSIS — H01.02A SQUAMOUS BLEPHARITIS OF UPPER AND LOWER EYELIDS OF BOTH EYES: ICD-10-CM

## 2021-05-05 DIAGNOSIS — H43.811 POSTERIOR VITREOUS DETACHMENT OF RIGHT EYE: ICD-10-CM

## 2021-05-05 DIAGNOSIS — H52.4 PRESBYOPIA: ICD-10-CM

## 2021-05-05 DIAGNOSIS — Z01.01 ENCOUNTER FOR EXAMINATION OF EYES AND VISION WITH ABNORMAL FINDINGS: Primary | ICD-10-CM

## 2021-05-05 DIAGNOSIS — H01.02B SQUAMOUS BLEPHARITIS OF UPPER AND LOWER EYELIDS OF BOTH EYES: ICD-10-CM

## 2021-05-05 PROBLEM — H91.90 HEARING DISORDER: Status: ACTIVE | Noted: 2019-02-13

## 2021-05-05 PROBLEM — Z87.39 HISTORY OF SPINAL STENOSIS: Status: ACTIVE | Noted: 2021-03-26

## 2021-05-05 PROBLEM — R03.0 ELEVATED BLOOD-PRESSURE READING WITHOUT DIAGNOSIS OF HYPERTENSION: Status: ACTIVE | Noted: 2019-02-13

## 2021-05-05 PROBLEM — R97.20 ELEVATED PSA, LESS THAN 10 NG/ML: Status: ACTIVE | Noted: 2019-02-13

## 2021-05-05 PROBLEM — D72.819 WBC DECREASED: Status: ACTIVE | Noted: 2021-03-26

## 2021-05-05 PROBLEM — E55.9 VITAMIN D DEFICIENCY: Status: ACTIVE | Noted: 2019-02-13

## 2021-05-05 PROCEDURE — 92014 COMPRE OPH EXAM EST PT 1/>: CPT | Performed by: OPHTHALMOLOGY

## 2021-05-05 PROCEDURE — 92015 DETERMINE REFRACTIVE STATE: CPT | Performed by: OPHTHALMOLOGY

## 2021-05-05 ASSESSMENT — VISUAL ACUITY
OD_PH_CC+: -1
OS_CC+: +2
OS_CC: 20/30
OD_CC: 1
CORRECTION_TYPE: GLASSES
OD_PH_CC: 20/30
OD_CC: 20/40
METHOD: SNELLEN - LINEAR
OS_CC: 1+

## 2021-05-05 ASSESSMENT — REFRACTION_MANIFEST
OD_CYLINDER: +1.25
OD_ADD: +2.75
OS_AXIS: 178
OD_SPHERE: +1.25
OS_CYLINDER: +1.00
OS_ADD: +2.75
OD_AXIS: 045
OS_SPHERE: +0.75

## 2021-05-05 ASSESSMENT — CUP TO DISC RATIO
OS_RATIO: 0.6
OD_RATIO: 0.6

## 2021-05-05 ASSESSMENT — REFRACTION_WEARINGRX
OS_CYLINDER: +0.50
OS_ADD: +2.50
SPECS_TYPE: PAL
OS_CYLINDER: SPHERE
OD_SPHERE: +1.25
OD_ADD: +2.50
OS_SPHERE: +0.75
OS_AXIS: 016
OS_SPHERE: +0.75
OS_ADD: +2.46
OD_SPHERE: +1.25
OD_ADD: +2.45
OD_CYLINDER: +0.50
OD_CYLINDER: SPHERE
OD_AXIS: 031
SPECS_TYPE: SUNGLS

## 2021-05-05 ASSESSMENT — TONOMETRY
IOP_METHOD: APPLANATION
OS_IOP_MMHG: 17
OD_IOP_MMHG: 18

## 2021-05-05 ASSESSMENT — CONF VISUAL FIELD
OS_NORMAL: 1
OD_NORMAL: 1

## 2021-05-05 ASSESSMENT — EXTERNAL EXAM - RIGHT EYE: OD_EXAM: NORMAL

## 2021-05-05 ASSESSMENT — EXTERNAL EXAM - LEFT EYE: OS_EXAM: NORMAL

## 2021-05-05 NOTE — LETTER
5/5/2021         RE: Wayne Gage  [address]  [place] [address]        Dear Colleague,    Thank you for referring your patient, Wayne Gage, to the [facility]. Please see a copy of my visit note below.     Current Eye Medications:  Refresh liquigel both eyes as needed, daily.    Not on doxy anymore.      Subjective:  Comprehensive Eye Exam.  No vision changes or concerns - glasses are working well.     Objective:  See Ophthalmology Exam.       Assessment:  Stable eye exam.      ICD-10-CM    1. Encounter for examination of eyes and vision with abnormal findings  Z01.01    2. Presbyopia  H52.4 REFRACTIVE STATUS   3. Hx of iritis, od  H20.9    4. Squamous blepharitis of upper and lower eyelids of both eyes  H01.02A     H01.02B    5. Posterior vitreous detachment of right eye  H43.811         Plan:  Glasses Rx given - optional.  Use artificial tears up to 4 times daily both eyes. (Refresh Tears, Systane Ultra/Balance, or Theratears).  Possible posterior vitreous detachment (sudden onset large floater and/or flashing lights) left eye discussed.  Call in January 2022 for an appointment in May 2022 for Complete Exam    Dr. Deluna [phone]           Again, thank you for allowing me to participate in the care of your patient.        Sincerely,        Harjit Deluna MD

## 2021-05-05 NOTE — PROGRESS NOTES
Current Eye Medications:  Refresh liquigel both eyes as needed, daily.    Not on doxy anymore.      Subjective:  Comprehensive Eye Exam.  No vision changes or concerns - glasses are working well.     Objective:  See Ophthalmology Exam.       Assessment:  Stable eye exam.      ICD-10-CM    1. Encounter for examination of eyes and vision with abnormal findings  Z01.01    2. Presbyopia  H52.4 REFRACTIVE STATUS   3. Hx of iritis, od  H20.9    4. Squamous blepharitis of upper and lower eyelids of both eyes  H01.02A     H01.02B    5. Posterior vitreous detachment of right eye  H43.811         Plan:  Glasses Rx given - optional.  Use artificial tears up to 4 times daily both eyes. (Refresh Tears, Systane Ultra/Balance, or Theratears).  Possible posterior vitreous detachment (sudden onset large floater and/or flashing lights) left eye discussed.  Call in January 2022 for an appointment in May 2022 for Complete Exam    Dr. Deluna [phone]

## 2021-05-05 NOTE — PATIENT INSTRUCTIONS
Glasses Rx given - optional.  Use artificial tears up to 4 times daily both eyes. (Refresh Tears, Systane Ultra/Balance, or Theratears).  Possible posterior vitreous detachment (sudden onset large floater and/or flashing lights) left eye discussed.  Call in January 2022 for an appointment in May 2022 for Complete Exam    Dr. Deluna [phone]

## 2021-07-13 ENCOUNTER — OFFICE VISIT (OUTPATIENT)
Dept: OPHTHALMOLOGY | Facility: CLINIC | Age: 66
End: 2021-07-13
Payer: COMMERCIAL

## 2021-07-13 DIAGNOSIS — H52.4 PRESBYOPIA: Primary | ICD-10-CM

## 2021-07-13 PROCEDURE — 99207 PR NO CHARGE LOS: CPT | Performed by: OPHTHALMOLOGY

## 2021-07-13 ASSESSMENT — REFRACTION_MANIFEST
OS_SPHERE: +0.75
OS_ADD: +2.75
OS_CYLINDER: +0.25
OS_AXIS: 178
OD_CYLINDER: +0.25
OD_ADD: +2.75
OD_AXIS: 045
OD_SPHERE: +1.25

## 2021-07-13 ASSESSMENT — REFRACTION_WEARINGRX
OS_CYLINDER: +1.00
OS_AXIS: 178
OD_SPHERE: +1.25
OS_ADD: +2.75
OS_CYLINDER: SPHERE
OD_CYLINDER: +1.25
OS_SPHERE: +0.75
OD_SPHERE: +1.25
OD_CYLINDER: SPHERE
SPECS_TYPE: SUN
SPECS_TYPE: PAL
OD_ADD: +2.66
OS_SPHERE: +0.75
OS_ADD: +2.66
OS_AXIS: 002
OD_AXIS: 043
OS_ADD: +2.50
OD_AXIS: 043
OD_ADD: +2.50
OD_CYLINDER: +1.00
OS_SPHERE: +1.00
OS_CYLINDER: +1.00
OD_ADD: +2.75
SPECS_TYPE: PAL
OD_SPHERE: +1.25

## 2021-07-13 ASSESSMENT — VISUAL ACUITY
METHOD: SNELLEN - LINEAR
OD_CC+: -1
OD_CC: 20/25
OS_CC+: -3
OS_CC: 20/20

## 2021-07-13 NOTE — LETTER
7/13/2021         RE: Wayne Gage  [address]  [place] [address]        Dear Colleague,    Thank you for referring your patient, Wayne Gage, to the [facility]. Please see a copy of my visit note below.     Current Eye Medications:  ***     Subjective:  Here for Rx recheck today     Objective:  See Ophthalmology Exam.       Assessment:      Plan:   See Patient Instructions.          Current Eye Medications:  Natural tears as needed both eyes, been using more last few days.      Subjective:  Patient hasn't been able to see out of right eye in distance since getting glasses in June. Vision is blurred right eye and doing well left eye in distance.     Objective:  See Ophthalmology Exam.     Assessment:  Refractive shift.      Plan:  Glasses regrind per tech assessment.  Harjit Deluna M.D.  [phone]                 Again, thank you for allowing me to participate in the care of your patient.        Sincerely,        Harjit Deluna MD

## 2021-07-13 NOTE — PROGRESS NOTES
Current Eye Medications:  Natural tears as needed both eyes, been using more last few days.      Subjective:  Patient hasn't been able to see out of right eye in distance since getting glasses in June. Vision is blurred right eye and doing well left eye in distance.     Objective:  See Ophthalmology Exam.     Assessment:  Refractive shift.      Plan:  Glasses regrind per tech assessment.  Harjit Deluna M.D.  [phone]

## 2021-11-12 ENCOUNTER — OFFICE VISIT (OUTPATIENT)
Dept: AUDIOLOGY | Facility: CLINIC | Age: 66
End: 2021-11-12

## 2021-11-12 DIAGNOSIS — H90.3 SENSORINEURAL HEARING LOSS, BILATERAL: Primary | ICD-10-CM

## 2021-11-12 PROCEDURE — 99207 PR NO CHARGE LOS: CPT | Performed by: AUDIOLOGIST

## 2021-11-12 PROCEDURE — V5299 HEARING SERVICE: HCPCS | Performed by: AUDIOLOGIST

## 2021-11-12 NOTE — PROGRESS NOTES
"AUDIOLOGY REPORT: HEARING AID RECHECK    SUBJECTIVE: Wayne Gage is a 66 year old male, :  1955, was seen in the Audiology Clinic at [facility] on 21 for a return check of their hearing aids. Patient was unaccompanied to today's visit.       Background:   Patient is here to have his hearing aids checked for their \"annual check up\". Patient reports no problems with the hearing aids ane he reports hearing well with them. Patient has Phonak Audeo  hearing aids that were purchased in .    Procedures:       SIDE: Both    : Phonak    TYPE: Audeo  RITE    S/N:      R: [id number]     L: [id number]    WARRANTY:  2017     General cleaning of the hearing aids was performed. The medium closed domes and waxguards were changed. Biologic listening check found the hearing aids sounding crisp and clear.     Plan:   Patient will return as needed for hearing aid concerns.     CHARGES:              [id number] [facility] $30    Kofi De Los Santos CCC-A  Licensed Audiologist #[id number]  2021        "

## 2023-04-07 ENCOUNTER — OFFICE VISIT (OUTPATIENT)
Dept: OPHTHALMOLOGY | Facility: CLINIC | Age: 68
End: 2023-04-07
Payer: COMMERCIAL

## 2023-04-07 DIAGNOSIS — H52.4 PRESBYOPIA: ICD-10-CM

## 2023-04-07 DIAGNOSIS — H40.003 GLAUCOMA SUSPECT OF BOTH EYES: ICD-10-CM

## 2023-04-07 DIAGNOSIS — H43.811 POSTERIOR VITREOUS DETACHMENT OF RIGHT EYE: ICD-10-CM

## 2023-04-07 DIAGNOSIS — H01.02A SQUAMOUS BLEPHARITIS OF UPPER AND LOWER EYELIDS OF BOTH EYES: ICD-10-CM

## 2023-04-07 DIAGNOSIS — Z01.01 ENCOUNTER FOR EXAMINATION OF EYES AND VISION WITH ABNORMAL FINDINGS: ICD-10-CM

## 2023-04-07 DIAGNOSIS — H20.9 IRITIS: Primary | ICD-10-CM

## 2023-04-07 DIAGNOSIS — H01.02B SQUAMOUS BLEPHARITIS OF UPPER AND LOWER EYELIDS OF BOTH EYES: ICD-10-CM

## 2023-04-07 PROCEDURE — 92014 COMPRE OPH EXAM EST PT 1/>: CPT | Performed by: OPHTHALMOLOGY

## 2023-04-07 PROCEDURE — 92015 DETERMINE REFRACTIVE STATE: CPT | Performed by: OPHTHALMOLOGY

## 2023-04-07 RX ORDER — TADALAFIL 5 MG/1
1 TABLET ORAL
COMMUNITY
Start: 2023-03-07

## 2023-04-07 RX ORDER — ROSUVASTATIN CALCIUM 5 MG/1
1 TABLET, COATED ORAL DAILY
COMMUNITY
Start: 2023-03-25

## 2023-04-07 ASSESSMENT — REFRACTION_MANIFEST
OD_SPHERE: +1.50
OD_AXIS: 042
OD_CYLINDER: +0.50
OS_CYLINDER: +0.50
OS_AXIS: 178
OS_SPHERE: +1.00
OD_ADD: +2.75
OS_ADD: +2.75

## 2023-04-07 ASSESSMENT — REFRACTION_WEARINGRX
OS_ADD: +2.75
SPECS_TYPE: PAL
OD_ADD: +2.80
OD_CYLINDER: SPHERE
OD_AXIS: 035
OS_SPHERE: +1.00
OS_CYLINDER: SPHERE
OS_SPHERE: +1.00
OS_CYLINDER: +0.25
OS_ADD: +2.82
OD_CYLINDER: +0.25
OD_ADD: +2.75
OD_SPHERE: +1.50
OD_SPHERE: +1.50
OS_AXIS: 015

## 2023-04-07 ASSESSMENT — CONF VISUAL FIELD
METHOD: COUNTING FINGERS
OS_SUPERIOR_TEMPORAL_RESTRICTION: 0
OD_SUPERIOR_TEMPORAL_RESTRICTION: 0
OS_SUPERIOR_NASAL_RESTRICTION: 0
OS_INFERIOR_NASAL_RESTRICTION: 0
OD_NORMAL: 1
OD_INFERIOR_NASAL_RESTRICTION: 0
OD_INFERIOR_TEMPORAL_RESTRICTION: 0
OD_SUPERIOR_NASAL_RESTRICTION: 0
OS_INFERIOR_TEMPORAL_RESTRICTION: 0
OS_NORMAL: 1

## 2023-04-07 ASSESSMENT — VISUAL ACUITY
OD_CC: 20/20
OS_CC: 20/20
CORRECTION_TYPE: GLASSES
OD_CC+: -2
METHOD: SNELLEN - LINEAR
OS_CC+: -2

## 2023-04-07 ASSESSMENT — TONOMETRY
OD_IOP_MMHG: 16
IOP_METHOD: APPLANATION
OS_IOP_MMHG: 14

## 2023-04-07 ASSESSMENT — EXTERNAL EXAM - LEFT EYE: OS_EXAM: NORMAL

## 2023-04-07 ASSESSMENT — CUP TO DISC RATIO
OD_RATIO: 0.6
OS_RATIO: 0.6

## 2023-04-07 ASSESSMENT — EXTERNAL EXAM - RIGHT EYE: OD_EXAM: NORMAL

## 2023-04-07 NOTE — PATIENT INSTRUCTIONS
"Glasses prescription given - optional  May use artificial tears up to four times a day (like Refresh Optive, Systane Balance, TheraTears, or generic artificial tears are ok. Avoid \"get the red out\" drops).   Possible posterior vitreous detachment (sudden onset large floater and/or flashing lights) left eye discussed.  Return visit 6 months for an intraocular pressure check, glaucoma OCT, retinal OCT, and Olmos Visual Field.   Harjit Deluna M.D.  [phone]   "

## 2023-04-07 NOTE — PROGRESS NOTES
" Current Eye Medications:  Refresh - both eyes PRN      Subjective:  Comprehensive eye exam - vision is stable with updated glasses. Gets dryness from time to time - use of refresh as needed helps.      Objective:  See Ophthalmology Exam.       Assessment: Stable eye exam.      ICD-10-CM    1. Hx of iritis, od  H20.9       2. Glaucoma suspect of both eyes  H40.003       3. Squamous blepharitis of upper and lower eyelids of both eyes  H01.02A     H01.02B       4. Posterior vitreous detachment of right eye  H43.811       5. Encounter for examination of eyes and vision with abnormal findings  Z01.01       6. Presbyopia  H52.4            Plan: Glasses prescription given - optional  May use artificial tears up to four times a day (like Refresh Optive, Systane Balance, TheraTears, or generic artificial tears are ok. Avoid \"get the red out\" drops).   Possible posterior vitreous detachment (sudden onset large floater and/or flashing lights) left eye discussed.  Return visit 6 months for an intraocular pressure check, glaucoma OCT, retinal OCT, and Olmos Visual Field.   Harjit Deluna M.D.  [phone]   "

## 2023-04-07 NOTE — LETTER
"    4/7/2023         RE: Wayne Gage  [address] [facility] [address]        Dear Colleague,    Thank you for referring your patient, Wayne Gage, to the [facility]. Please see a copy of my visit note below.     Current Eye Medications:  Refresh - both eyes PRN      Subjective:  Comprehensive eye exam - vision is stable with updated glasses. Gets dryness from time to time - use of refresh as needed helps.      Objective:  See Ophthalmology Exam.       Assessment: Stable eye exam.      ICD-10-CM    1. Hx of iritis, od  H20.9       2. Glaucoma suspect of both eyes  H40.003       3. Squamous blepharitis of upper and lower eyelids of both eyes  H01.02A     H01.02B       4. Posterior vitreous detachment of right eye  H43.811       5. Encounter for examination of eyes and vision with abnormal findings  Z01.01       6. Presbyopia  H52.4            Plan: Glasses prescription given - optional  May use artificial tears up to four times a day (like Refresh Optive, Systane Balance, TheraTears, or generic artificial tears are ok. Avoid \"get the red out\" drops).   Possible posterior vitreous detachment (sudden onset large floater and/or flashing lights) left eye discussed.  Return visit 6 months for an intraocular pressure check, glaucoma OCT, retinal OCT, and Olmos Visual Field.   Harjit Deluna M.D.  [phone]       Again, thank you for allowing me to participate in the care of your patient.        Sincerely,        Harjit Deluna MD    "

## 2023-04-09 PROBLEM — H40.003 GLAUCOMA SUSPECT OF BOTH EYES: Status: ACTIVE | Noted: 2023-04-09

## 2023-10-23 ENCOUNTER — OFFICE VISIT (OUTPATIENT)
Dept: OPHTHALMOLOGY | Facility: CLINIC | Age: 68
End: 2023-10-23
Payer: COMMERCIAL

## 2023-10-23 DIAGNOSIS — H40.003 GLAUCOMA SUSPECT OF BOTH EYES: Primary | ICD-10-CM

## 2023-10-23 PROCEDURE — 92012 INTRM OPH EXAM EST PATIENT: CPT | Performed by: OPHTHALMOLOGY

## 2023-10-23 PROCEDURE — 92133 CPTRZD OPH DX IMG PST SGM ON: CPT | Performed by: OPHTHALMOLOGY

## 2023-10-23 PROCEDURE — 92083 EXTENDED VISUAL FIELD XM: CPT | Performed by: OPHTHALMOLOGY

## 2023-10-23 ASSESSMENT — VISUAL ACUITY
OS_CC+: +1
METHOD: SNELLEN - LINEAR
OD_CC+: -1
CORRECTION_TYPE: GLASSES
OD_CC: 20/25
OS_CC: 20/25

## 2023-10-23 ASSESSMENT — TONOMETRY
OD_IOP_MMHG: 15
IOP_METHOD: APPLANATION
OS_IOP_MMHG: 16

## 2023-10-23 ASSESSMENT — PACHYMETRY
OD_CT(UM): .583
OS_CT(UM): .576

## 2023-10-23 ASSESSMENT — EXTERNAL EXAM - RIGHT EYE: OD_EXAM: NORMAL

## 2023-10-23 ASSESSMENT — EXTERNAL EXAM - LEFT EYE: OS_EXAM: NORMAL

## 2023-10-23 NOTE — PROGRESS NOTES
Current Eye Medications:  refresh both eyes as needed.       Subjective:  Follow up glaucoma suspect.  Patient is here for a pressure check, OCT, visual field, and pachymetry.  No vision changes or concerns.      Objective:  See Ophthalmology Exam.       Assessment:  Baseline glaucoma OCT and Olmos Visual Field within normal limits both eyes in patient who is a glaucoma suspect.  Intraocular pressures stable; corneas intermediate thickness.  Retinal OCT shows mild epiretinal membrane both eyes.      Plan:  Continue observation with regard to glaucoma suspect status.   Return visit 6 months for a complete exam.    Harjit Deluna M.D.  [phone]

## 2023-10-23 NOTE — LETTER
10/23/2023         RE: Wayne Gage  [address] [facility] [address]        Dear Colleague,    Thank you for referring your patient, Wayne Gage, to the [facility]. Please see a copy of my visit note below.     Current Eye Medications:  refresh both eyes as needed.       Subjective:  Follow up glaucoma suspect.  Patient is here for a pressure check, OCT, visual field, and pachymetry.  No vision changes or concerns.      Objective:  See Ophthalmology Exam.       Assessment:  Baseline glaucoma OCT and Olmos Visual Field within normal limits both eyes in patient who is a glaucoma suspect.  Intraocular pressures stable; corneas intermediate thickness.  Retinal OCT shows mild epiretinal membrane both eyes.      Plan:  Continue observation with regard to glaucoma suspect status.   Return visit 6 months for a complete exam.    Harjit Deluna M.D.  [phone]         Again, thank you for allowing me to participate in the care of your patient.        Sincerely,        Harjit Deluna MD

## 2024-02-27 ENCOUNTER — VIRTUAL VISIT (OUTPATIENT)
Dept: FAMILY MEDICINE | Facility: CLINIC | Age: 69
End: 2024-02-27
Payer: COMMERCIAL

## 2024-02-27 ENCOUNTER — LAB (OUTPATIENT)
Dept: FAMILY MEDICINE | Facility: CLINIC | Age: 69
End: 2024-02-27
Attending: FAMILY MEDICINE
Payer: COMMERCIAL

## 2024-02-27 DIAGNOSIS — Z20.818 EXPOSURE TO STREP THROAT: ICD-10-CM

## 2024-02-27 DIAGNOSIS — J02.9 SORE THROAT: Primary | ICD-10-CM

## 2024-02-27 LAB
DEPRECATED S PYO AG THROAT QL EIA: NEGATIVE
GROUP A STREP BY PCR: NOT DETECTED

## 2024-02-27 PROCEDURE — 99203 OFFICE O/P NEW LOW 30 MIN: CPT | Mod: 95 | Performed by: FAMILY MEDICINE

## 2024-02-27 PROCEDURE — 87651 STREP A DNA AMP PROBE: CPT | Performed by: FAMILY MEDICINE

## 2024-02-27 PROCEDURE — 99207 PR NO CHARGE NURSE ONLY: CPT

## 2024-02-27 RX ORDER — AMOXICILLIN 875 MG
875 TABLET ORAL 2 TIMES DAILY
Qty: 20 TABLET | Refills: 0 | Status: SHIPPED | OUTPATIENT
Start: 2024-02-27 | End: 2024-03-08

## 2024-02-27 NOTE — PROGRESS NOTES
Patient presenting to clinic for Strep testing which was ordered through a virtual visit provider. Patient is aware of how he will be notified of results.     Scott Haynes LPN   [place], [place]  Adult/[facility]     02/27/24 at 1:52 PM

## 2024-02-27 NOTE — PROGRESS NOTES
Francisco Javier is a 68 year old who is being evaluated via a billable video visit.      How would you like to obtain your AVS? MyChart    Will anyone else be joining your video visit? No          Assessment & Plan     Sore throat  Pt will get lab appointment   - Streptococcus A Rapid Screen w/Reflex to PCR - Clinic Collect    Exposure to strep throat  May empirically Treat as wife has strep Throat  Follow up if not better      Ordering of each unique test  Prescription drug management      Follow up 1 week if not better/sooner if worse        Subjective   Francisco Javier is a 68 year old, presenting for the following health issues:  No chief complaint on file.    HPI   Sore Throat since Sunday  Had Low Grade fever  Last night had a tem 100.1 to 100.8 temp-temporal  Today 99.2  Mild cough nonprodutive   Wife has strep Throat  No sob   No wheezing          Review of Systems  CONSTITUTIONAL: NEGATIVE for fever, chills, change in weight  ENT/MOUTH: NEGATIVE for ear, mouth and throat problems  RESP: NEGATIVE for significant cough or SOB  CV: NEGATIVE for chest pain, palpitations or peripheral edema  GI: NEGATIVE for nausea, abdominal pain, heartburn, or change in bowel habits  PSYCHIATRIC: NEGATIVE for changes in mood or affect      Objective           Vitals:  No vitals were obtained today due to virtual visit.    Physical Exam   GENERAL: alert and no distress  EYES: Eyes grossly normal to inspection.  No discharge or erythema, or obvious scleral/conjunctival abnormalities.  RESP: No audible wheeze, cough, or visible cyanosis.    SKIN: Visible skin clear. No significant rash, abnormal pigmentation or lesions.  NEURO: Cranial nerves grossly intact.  Mentation and speech appropriate for age.  PSYCH: Appropriate affect, tone, and pace of words    Pending labs       Video-Visit Details    Type of service:  Video Visit   Video Start Time: 12:438PM  Video End Time:12:44 PM    Originating Location (pt. Location): Home    Distant Location (provider  location):  On-site  Platform used for Video Visit: Jimy  Signed Electronically by: Kayley Vasquez MD

## 2024-06-30 ENCOUNTER — HEALTH MAINTENANCE LETTER (OUTPATIENT)
Age: 69
End: 2024-06-30

## 2025-02-03 ENCOUNTER — OFFICE VISIT (OUTPATIENT)
Dept: OPHTHALMOLOGY | Facility: CLINIC | Age: 70
End: 2025-02-03
Payer: COMMERCIAL

## 2025-02-03 DIAGNOSIS — H20.9 IRITIS: ICD-10-CM

## 2025-02-03 DIAGNOSIS — H01.02B SQUAMOUS BLEPHARITIS OF UPPER AND LOWER EYELIDS OF BOTH EYES: ICD-10-CM

## 2025-02-03 DIAGNOSIS — H01.02A SQUAMOUS BLEPHARITIS OF UPPER AND LOWER EYELIDS OF BOTH EYES: ICD-10-CM

## 2025-02-03 DIAGNOSIS — H43.811 POSTERIOR VITREOUS DETACHMENT OF RIGHT EYE: ICD-10-CM

## 2025-02-03 DIAGNOSIS — Z01.01 ENCOUNTER FOR EXAMINATION OF EYES AND VISION WITH ABNORMAL FINDINGS: ICD-10-CM

## 2025-02-03 DIAGNOSIS — H40.003 GLAUCOMA SUSPECT OF BOTH EYES: Primary | ICD-10-CM

## 2025-02-03 DIAGNOSIS — H52.4 PRESBYOPIA: ICD-10-CM

## 2025-02-03 ASSESSMENT — CONF VISUAL FIELD
OS_SUPERIOR_TEMPORAL_RESTRICTION: 0
OD_SUPERIOR_TEMPORAL_RESTRICTION: 0
OS_INFERIOR_TEMPORAL_RESTRICTION: 0
OD_INFERIOR_TEMPORAL_RESTRICTION: 0
OS_NORMAL: 1
OS_SUPERIOR_NASAL_RESTRICTION: 0
OD_NORMAL: 1
OS_INFERIOR_NASAL_RESTRICTION: 0
OD_INFERIOR_NASAL_RESTRICTION: 0
OD_SUPERIOR_NASAL_RESTRICTION: 0

## 2025-02-03 ASSESSMENT — VISUAL ACUITY
OS_CC: J1
OD_CC: J1
OS_CC: 20/30
METHOD: SNELLEN - LINEAR
OD_CC: 20/25
OS_CC+: -1
CORRECTION_TYPE: GLASSES

## 2025-02-03 ASSESSMENT — REFRACTION_MANIFEST
OS_SPHERE: +1.00
OS_AXIS: 176
OS_ADD: +2.50
OD_ADD: +2.50
OD_AXIS: 001
OS_CYLINDER: +1.00
OD_SPHERE: +1.75
OD_CYLINDER: +0.50

## 2025-02-03 ASSESSMENT — REFRACTION_WEARINGRX
OS_CYLINDER: +0.25
OD_ADD: +2.75
OD_CYLINDER: SPHERE
OD_SPHERE: +1.50
OS_SPHERE: +1.00
OS_AXIS: 015
OS_ADD: +2.75
SPECS_TYPE: PAL

## 2025-02-03 ASSESSMENT — TONOMETRY
OS_IOP_MMHG: 16
OD_IOP_MMHG: 16
IOP_METHOD: APPLANATION

## 2025-02-03 ASSESSMENT — CUP TO DISC RATIO
OS_RATIO: 0.6
OD_RATIO: 0.6

## 2025-02-03 ASSESSMENT — EXTERNAL EXAM - LEFT EYE: OS_EXAM: NORMAL

## 2025-02-03 ASSESSMENT — EXTERNAL EXAM - RIGHT EYE: OD_EXAM: NORMAL

## 2025-02-03 NOTE — PATIENT INSTRUCTIONS
"Glasses prescription given - optional  Could try +3.00 over the counter glasses for computer work.    May use artificial tears up to four times a day (like Refresh Optive, Systane Balance, or TheraTears. Avoid \"get the red out\" drops and generic artifical tears).     Possible posterior vitreous detachment (sudden onset large floater and/or flashing lights) left eye discussed.     Continue observation with regard to glaucoma suspect status.     Call in October 2025 for an appointment in February 2026  for Complete Exam    Dr. Deluna [phone]    "

## 2025-02-03 NOTE — PROGRESS NOTES
" Current Eye Medications:  Refresh gel and artificial tears as needed      Subjective:  Complete exam: Patient is happy with his distance visual acuity and near visual acuity.   Patient is being followed for glaucoma suspect and was told to return in 6 months for a complete exam.  \" I have no complaints, just following instructions\"     Objective:  See Ophthalmology Exam.       Assessment:  Stable eye exam.  Stable intraocular pressures and discs.      ICD-10-CM    1. Glaucoma suspect of both eyes  H40.003       2. Hx of iritis, od  H20.9       3. Squamous blepharitis of upper and lower eyelids of both eyes  H01.02A     H01.02B       4. Posterior vitreous detachment of right eye  H43.811       5. Encounter for examination of eyes and vision with abnormal findings  Z01.01       6. Presbyopia  H52.4            Plan   Glasses prescription given - optional  Could try +3.00 over the counter glasses for computer work.    May use artificial tears up to four times a day (like Refresh Optive, Systane Balance, or TheraTears. Avoid \"get the red out\" drops and generic artifical tears).     Possible posterior vitreous detachment (sudden onset large floater and/or flashing lights) left eye discussed.     Continue observation with regard to glaucoma suspect status.     Call in October 2025 for an appointment in February 2026 for Complete Exam    Dr. Deluna [phone]      "

## 2025-02-03 NOTE — LETTER
"2/3/2025      Wayne Gage  [address] [facility] [address]  [place] [address]      Dear Colleague,    Thank you for referring your patient, Wayne Gage, to the [facility]. Please see a copy of my visit note below.     Current Eye Medications:  Refresh gel and artificial tears as needed      Subjective:  Complete exam: Patient is happy with his distance visual acuity and near visual acuity.   Patient is being followed for glaucoma suspect and was told to return in 6 months for a complete exam.  \" I have no complaints, just following instructions\"     Objective:  See Ophthalmology Exam.       Assessment:  Stable eye exam.  Stable intraocular pressures and discs.      ICD-10-CM    1. Glaucoma suspect of both eyes  H40.003       2. Hx of iritis, od  H20.9       3. Squamous blepharitis of upper and lower eyelids of both eyes  H01.02A     H01.02B       4. Posterior vitreous detachment of right eye  H43.811       5. Encounter for examination of eyes and vision with abnormal findings  Z01.01       6. Presbyopia  H52.4            Plan   Glasses prescription given - optional  Could try +3.00 over the counter glasses for computer work.    May use artificial tears up to four times a day (like Refresh Optive, Systane Balance, or TheraTears. Avoid \"get the red out\" drops and generic artifical tears).     Possible posterior vitreous detachment (sudden onset large floater and/or flashing lights) left eye discussed.     Continue observation with regard to glaucoma suspect status.     Call in October 2025 for an appointment in February 2026 for Complete Exam    Dr. Deluna [phone]        Again, thank you for allowing me to participate in the care of your patient.        Sincerely,        Harjit Deluna MD    Electronically signed"

## 2025-05-11 ENCOUNTER — HEALTH MAINTENANCE LETTER (OUTPATIENT)
Age: 70
End: 2025-05-11